# Patient Record
Sex: FEMALE | Race: WHITE | URBAN - METROPOLITAN AREA
[De-identification: names, ages, dates, MRNs, and addresses within clinical notes are randomized per-mention and may not be internally consistent; named-entity substitution may affect disease eponyms.]

---

## 2022-05-16 ENCOUNTER — OFFICE VISIT (OUTPATIENT)
Dept: URBAN - METROPOLITAN AREA CLINIC 71 | Facility: CLINIC | Age: 72
End: 2022-05-16
Payer: MEDICARE

## 2022-05-16 DIAGNOSIS — H43.813 VITREOUS DEGENERATION, BILATERAL: ICD-10-CM

## 2022-05-16 DIAGNOSIS — Z96.1 PRESENCE OF INTRAOCULAR LENS: ICD-10-CM

## 2022-05-16 PROCEDURE — 99204 OFFICE O/P NEW MOD 45 MIN: CPT | Performed by: OPTOMETRIST

## 2022-05-16 ASSESSMENT — INTRAOCULAR PRESSURE
OS: 17
OD: 17

## 2022-05-16 NOTE — IMPRESSION/PLAN
Impression: Vitreous degeneration, bilateral: H43.813.  OS was not dilated today Plan: Continue to monitor yearly with DE

## 2022-05-16 NOTE — IMPRESSION/PLAN
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 Symptoms first noticed 1 week ago, have gotten worse continually. Macula likely involved, covered by bullous retina. Plan: Discussed findings with pt. Refer to retina specialist as soon as possible for further evaluation and treatment. Pt is aware that she will likely need to travel to [facility]. Explained procedure in detail. Advised pt to hold off on eating or drinking anything else today if she is able to be seen right away by retina. Informed pt that treatment may not make a significant improvement in her vision OD, depending on whether or not the macula is involved.

## 2022-05-17 ENCOUNTER — OFFICE VISIT (OUTPATIENT)
Dept: URBAN - METROPOLITAN AREA CLINIC 68 | Facility: CLINIC | Age: 72
End: 2022-05-17
Payer: MEDICARE

## 2022-05-17 DIAGNOSIS — H04.123 DRY EYE SYNDROME OF BILATERAL LACRIMAL GLANDS: ICD-10-CM

## 2022-05-17 DIAGNOSIS — H33.21 RETINAL DETACHMENT OF RIGHT EYE: Primary | ICD-10-CM

## 2022-05-17 PROCEDURE — 99204 OFFICE O/P NEW MOD 45 MIN: CPT | Performed by: OPHTHALMOLOGY

## 2022-05-17 PROCEDURE — 92134 CPTRZ OPH DX IMG PST SGM RTA: CPT | Performed by: OPHTHALMOLOGY

## 2022-05-17 ASSESSMENT — INTRAOCULAR PRESSURE
OS: 13
OD: 14

## 2022-05-17 NOTE — IMPRESSION/PLAN
Impression: Retinal Detachment, OD. Plan: Unfortunately, there is an RD OD. Recommend surgery. RBA discussed. The patient elects surgery. Thanks, David Simmons Plan: 25 g PPV / MP / PRP/ SO OD (patient lives in [place])

## 2022-05-23 ENCOUNTER — SURGERY (OUTPATIENT)
Dept: URBAN - METROPOLITAN AREA EXTERNAL CLINIC 26 | Facility: EXTERNAL CLINIC | Age: 72
End: 2022-05-23
Payer: MEDICARE

## 2022-05-23 PROCEDURE — 67113 REPAIR RETINAL DETACH CPLX: CPT | Performed by: OPHTHALMOLOGY

## 2022-05-24 ENCOUNTER — POST-OPERATIVE VISIT (OUTPATIENT)
Dept: URBAN - METROPOLITAN AREA CLINIC 73 | Facility: CLINIC | Age: 72
End: 2022-05-24
Payer: MEDICARE

## 2022-05-24 PROCEDURE — 99024 POSTOP FOLLOW-UP VISIT: CPT | Performed by: OPHTHALMOLOGY

## 2022-05-24 ASSESSMENT — INTRAOCULAR PRESSURE
OS: 14
OD: 18

## 2022-05-24 NOTE — IMPRESSION/PLAN
Impression: S/P 25 g PPV / MP / PRP/ SO OD - 05/23/2022 ([facility]) Plan: Retina is attached. No s/s of infection IOP is good at (18) Drops Vigamox QID Pred QID 

RTC 1 week

## 2022-05-31 ENCOUNTER — POST-OPERATIVE VISIT (OUTPATIENT)
Dept: URBAN - METROPOLITAN AREA CLINIC 68 | Facility: CLINIC | Age: 72
End: 2022-05-31
Payer: MEDICARE

## 2022-05-31 DIAGNOSIS — H33.21 RETINAL DETACHMENT OF RIGHT EYE: Primary | ICD-10-CM

## 2022-05-31 PROCEDURE — 99024 POSTOP FOLLOW-UP VISIT: CPT | Performed by: OPHTHALMOLOGY

## 2022-05-31 ASSESSMENT — INTRAOCULAR PRESSURE
OD: 20
OS: 14

## 2022-05-31 NOTE — IMPRESSION/PLAN
Impression: S/P 25 g PPV / MP / PRP/ SO OD 05/23/2022 ([facility]) Plan: Retina is attached. No s/s of infection IOP is good at (20) Drops Vigamox QID until done Pred QID 

RTC 8 weeks, OCT OU

## 2022-09-06 ENCOUNTER — OFFICE VISIT (OUTPATIENT)
Facility: LOCATION | Age: 72
End: 2022-09-06
Payer: MEDICARE

## 2022-09-06 DIAGNOSIS — H33.21 RETINAL DETACHMENT OF RIGHT EYE: Primary | ICD-10-CM

## 2022-09-06 DIAGNOSIS — H43.813 VITREOUS DEGENERATION, BILATERAL: ICD-10-CM

## 2022-09-06 DIAGNOSIS — H04.123 DRY EYE SYNDROME OF BILATERAL LACRIMAL GLANDS: ICD-10-CM

## 2022-09-06 DIAGNOSIS — Z96.1 PRESENCE OF INTRAOCULAR LENS: ICD-10-CM

## 2022-09-06 PROCEDURE — 92134 CPTRZ OPH DX IMG PST SGM RTA: CPT | Performed by: OPHTHALMOLOGY

## 2022-09-06 PROCEDURE — 99214 OFFICE O/P EST MOD 30 MIN: CPT | Performed by: OPHTHALMOLOGY

## 2022-09-06 ASSESSMENT — INTRAOCULAR PRESSURE
OS: 17
OD: 16

## 2022-09-06 NOTE — IMPRESSION/PLAN
Impression: h/o RD OD
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 Ready for SO removal. RBA discussed. The patient elects surgery Plan: 25 g PPV / remove SO / MP / PRP/ partial air OD (patient lives in [place])

## 2022-09-26 ENCOUNTER — SURGERY (OUTPATIENT)
Dept: URBAN - METROPOLITAN AREA EXTERNAL CLINIC 26 | Facility: EXTERNAL CLINIC | Age: 72
End: 2022-09-26
Payer: MEDICARE

## 2022-09-26 PROCEDURE — 67113 REPAIR RETINAL DETACH CPLX: CPT | Performed by: OPHTHALMOLOGY

## 2022-09-27 ENCOUNTER — POST-OPERATIVE VISIT (OUTPATIENT)
Facility: LOCATION | Age: 72
End: 2022-09-27
Payer: MEDICARE

## 2022-09-27 DIAGNOSIS — H33.21 RETINAL DETACHMENT OF RIGHT EYE: Primary | ICD-10-CM

## 2022-09-27 PROCEDURE — 99024 POSTOP FOLLOW-UP VISIT: CPT | Performed by: OPHTHALMOLOGY

## 2022-09-27 ASSESSMENT — INTRAOCULAR PRESSURE
OS: 13
OD: 10

## 2022-09-27 NOTE — IMPRESSION/PLAN
Impression: S/P 25 g PPV / remove SO / MP / PRP/ partial air OD - 09/26/2022 ([address] [facility]) Plan: Retina is attached. No s/s of infection IOP is good at (10) Drops Vigamox QID Pred QID 

RTC 1-3 week

## 2022-10-18 ENCOUNTER — POST-OPERATIVE VISIT (OUTPATIENT)
Facility: LOCATION | Age: 72
End: 2022-10-18
Payer: MEDICARE

## 2022-10-18 DIAGNOSIS — H33.21 RETINAL DETACHMENT OF RIGHT EYE: Primary | ICD-10-CM

## 2022-10-18 PROCEDURE — 99024 POSTOP FOLLOW-UP VISIT: CPT | Performed by: OPHTHALMOLOGY

## 2022-10-18 ASSESSMENT — INTRAOCULAR PRESSURE
OS: 11
OD: 14

## 2022-10-18 NOTE — IMPRESSION/PLAN
Impression: VH OD
h/o serous RD OD
S/P 25 g PPV / remove SO / MP / PRP/ partial air OD - 09/26/2022 ([address] [facility]) Plan: The [facility] OD worsened. There is dense VH OD. Recommend surgery. RBA discussed. The patient elects surgery. Retina is attached. No s/s of infection IOP is good at (14) Drops Vigamox QID until done Pred QID Plan: 25 g PPV / MP / PRP / possible SO OD (patient lives in [place])

## 2022-10-25 ENCOUNTER — POST-OPERATIVE VISIT (OUTPATIENT)
Facility: LOCATION | Age: 72
End: 2022-10-25
Payer: MEDICARE

## 2022-10-25 DIAGNOSIS — H33.21 RETINAL DETACHMENT OF RIGHT EYE: Primary | ICD-10-CM

## 2022-10-25 PROCEDURE — 99024 POSTOP FOLLOW-UP VISIT: CPT | Performed by: OPHTHALMOLOGY

## 2022-10-25 ASSESSMENT — INTRAOCULAR PRESSURE
OS: 12
OD: 16

## 2022-10-25 NOTE — IMPRESSION/PLAN
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 Retina is attached. No s/s of infection IOP is good at (15) Drops Vigamox QID Pred QID 

RTC 2 weeks, OCT OU

## 2022-11-08 ENCOUNTER — POST-OPERATIVE VISIT (OUTPATIENT)
Facility: LOCATION | Age: 72
End: 2022-11-08
Payer: MEDICARE

## 2022-11-08 DIAGNOSIS — H33.21 RETINAL DETACHMENT OF RIGHT EYE: Primary | ICD-10-CM

## 2022-11-08 PROCEDURE — 99024 POSTOP FOLLOW-UP VISIT: CPT | Performed by: OPHTHALMOLOGY

## 2022-11-08 ASSESSMENT — INTRAOCULAR PRESSURE
OS: 15
OD: 26

## 2022-11-08 NOTE — IMPRESSION/PLAN
Impression: S/P 25 g PPV / MP / PRP  OD - 10/04/2022 ([facility]) Plan: There is residual VH OD. Retina is attached. No s/s of infection IOP is good at (26) Drops Vigamox QID until done Pred QID 

RTC 3 months, OCT OU

## 2023-03-07 ENCOUNTER — OFFICE VISIT (OUTPATIENT)
Facility: LOCATION | Age: 73
End: 2023-03-07
Payer: MEDICARE

## 2023-03-07 DIAGNOSIS — H04.123 DRY EYE SYNDROME OF BILATERAL LACRIMAL GLANDS: ICD-10-CM

## 2023-03-07 DIAGNOSIS — H43.813 VITREOUS DEGENERATION, BILATERAL: ICD-10-CM

## 2023-03-07 DIAGNOSIS — H33.21 RETINAL DETACHMENT OF RIGHT EYE: Primary | ICD-10-CM

## 2023-03-07 DIAGNOSIS — Z96.1 PRESENCE OF INTRAOCULAR LENS: ICD-10-CM

## 2023-03-07 PROCEDURE — 99214 OFFICE O/P EST MOD 30 MIN: CPT | Performed by: OPHTHALMOLOGY

## 2023-03-07 PROCEDURE — 92134 CPTRZ OPH DX IMG PST SGM RTA: CPT | Performed by: OPHTHALMOLOGY

## 2023-03-07 ASSESSMENT — INTRAOCULAR PRESSURE
OD: 14
OS: 13

## 2023-03-07 NOTE — IMPRESSION/PLAN
Impression: h/o RD, OD. S/P 25 g PPV / MP / PRP  OD - 10/04/2022 ([facility]) S/P 25 g PPV / MP / PRP/ SO OD 05/23/2022 ([facility]) Plan: Exam and OCT reveal no CME. The patient has done well with surgery. Observe. RDW Return in 24 months, OCT OU